# Patient Record
Sex: FEMALE | Race: WHITE | NOT HISPANIC OR LATINO | Employment: FULL TIME | URBAN - METROPOLITAN AREA
[De-identification: names, ages, dates, MRNs, and addresses within clinical notes are randomized per-mention and may not be internally consistent; named-entity substitution may affect disease eponyms.]

---

## 2017-07-24 ENCOUNTER — TRANSCRIBE ORDERS (OUTPATIENT)
Dept: ADMINISTRATIVE | Facility: HOSPITAL | Age: 47
End: 2017-07-24

## 2017-07-24 DIAGNOSIS — Z12.31 VISIT FOR SCREENING MAMMOGRAM: Primary | ICD-10-CM

## 2017-08-11 ENCOUNTER — HOSPITAL ENCOUNTER (OUTPATIENT)
Dept: RADIOLOGY | Age: 47
Discharge: HOME | End: 2017-08-11
Payer: COMMERCIAL

## 2017-08-11 DIAGNOSIS — Z12.31 VISIT FOR SCREENING MAMMOGRAM: ICD-10-CM

## 2017-08-11 PROCEDURE — 77063 BREAST TOMOSYNTHESIS BI: CPT

## 2017-08-11 PROCEDURE — G0202 SCR MAMMO BI INCL CAD: HCPCS

## 2018-03-23 ENCOUNTER — TELEPHONE (OUTPATIENT)
Dept: FAMILY MEDICINE CLINIC | Facility: CLINIC | Age: 48
End: 2018-03-23

## 2018-03-23 NOTE — TELEPHONE ENCOUNTER
----- Message from [address] sent at 3/23/2018  2:12 PM EDT -----  We got the pathology report on her  Looks like she's seeing surgeon at [facility]  Can you please call to make sure she's following up with them or someone  I'll put report on Gian's desk for review     Thanks

## 2018-03-23 NOTE — TELEPHONE ENCOUNTER
DENNIS Parrish             We got the pathology report on her  Looks like she's seeing surgeon at [facility]  Can you please call to make sure she's following up with them or someone   I'll put report on Gian's desk for review  Thanks        Spoke with pt she was dx with breast cancer, had surgery to remove what needed to be removed and is f/u with an oncologist  Pt was encouraged to call if she needed anything

## 2018-04-13 LAB — PREGNANCY TEST URINE (HISTORICAL): NEGATIVE

## 2018-04-17 LAB
ABSOL LYMPHOCYTES (HISTORICAL): 1.4 K/UL (ref 0.5–4)
ALBUMIN SERPL BCP-MCNC: 4.6 G/DL (ref 3–5.2)
ALP SERPL-CCNC: 73 U/L (ref 43–122)
ALT SERPL W P-5'-P-CCNC: 44 U/L (ref 9–52)
ANION GAP SERPL CALCULATED.3IONS-SCNC: 10 MMOL/L (ref 5–14)
AST SERPL W P-5'-P-CCNC: 39 U/L (ref 14–36)
BASOPHILS # BLD AUTO: 0.1 K/UL (ref 0–0.1)
BASOPHILS # BLD AUTO: 1 % (ref 0–1)
BILIRUB SERPL-MCNC: 0.7 MG/DL
BUN SERPL-MCNC: 12 MG/DL (ref 5–25)
CALCIUM SERPL-MCNC: 10 MG/DL (ref 8.4–10.2)
CHLORIDE SERPL-SCNC: 100 MEQ/L (ref 97–108)
CHOLEST SERPL-MCNC: 198 MG/DL
CHOLEST/HDLC SERPL: 3 {RATIO}
CO2 SERPL-SCNC: 31 MMOL/L (ref 22–30)
CREATINE, SERUM (HISTORICAL): 0.71 MG/DL (ref 0.6–1.2)
DEPRECATED RDW RBC AUTO: 12.9 %
EGFR (HISTORICAL): >60 ML/MIN/1.73 M2
EOSINOPHIL # BLD AUTO: 0.5 K/UL (ref 0–0.4)
EOSINOPHIL NFR BLD AUTO: 9 % (ref 0–6)
GLUCOSE SERPL-MCNC: 94 MG/DL (ref 70–99)
HCG QUANTITATIVE (HISTORICAL): <3 MIU/ML
HCT VFR BLD AUTO: 42.2 % (ref 36–46)
HDLC SERPL-MCNC: 67 MG/DL
HGB BLD-MCNC: 14.5 G/DL (ref 12–16)
LDL/HDL RATIO (HISTORICAL): 1.7
LDLC SERPL CALC-MCNC: 116 MG/DL
LYMPHOCYTES NFR BLD AUTO: 22 % (ref 25–45)
MCH RBC QN AUTO: 31.8 PG (ref 26–34)
MCHC RBC AUTO-ENTMCNC: 34.4 % (ref 31–36)
MCV RBC AUTO: 92 FL (ref 80–100)
MONOCYTES # BLD AUTO: 0.6 K/UL (ref 0.2–0.9)
MONOCYTES NFR BLD AUTO: 10 % (ref 1–10)
NEUTROPHILS ABS COUNT (HISTORICAL): 3.7 K/UL (ref 1.8–7.8)
NEUTS SEG NFR BLD AUTO: 58 % (ref 45–65)
PLATELET # BLD AUTO: 215 K/MCL (ref 150–450)
POTASSIUM SERPL-SCNC: 4.5 MEQ/L (ref 3.6–5)
RBC # BLD AUTO: 4.57 M/MCL (ref 4–5.2)
SODIUM SERPL-SCNC: 141 MEQ/L (ref 137–147)
TOTAL PROTEIN (HISTORICAL): 7.2 G/DL (ref 5.9–8.4)
TRIGL SERPL-MCNC: 75 MG/DL
TROPONIN I SERPL-MCNC: <0.01 NG/ML (ref 0–0.03)
VLDLC SERPL CALC-MCNC: 15 MG/DL (ref 0–40)
WBC # BLD AUTO: 6.2 K/MCL (ref 4.5–11)

## 2018-05-02 LAB
ABSOL LYMPHOCYTES (HISTORICAL): 1.4 K/UL (ref 0.5–4)
ALBUMIN SERPL BCP-MCNC: 4.1 G/DL (ref 3–5.2)
ALP SERPL-CCNC: 85 U/L (ref 43–122)
ALT SERPL W P-5'-P-CCNC: 38 U/L (ref 9–52)
ANION GAP SERPL CALCULATED.3IONS-SCNC: 10 MMOL/L (ref 5–14)
AST SERPL W P-5'-P-CCNC: 26 U/L (ref 14–36)
BANDS (HISTORICAL): 4 % (ref 3–11)
BASOPHILS # BLD AUTO: 0.1 K/UL (ref 0–0.1)
BASOPHILS # BLD AUTO: 1 % (ref 0–1)
BILIRUB SERPL-MCNC: 0.2 MG/DL
BUN SERPL-MCNC: 15 MG/DL (ref 5–25)
CALCIUM SERPL-MCNC: 9.3 MG/DL (ref 8.4–10.2)
CHLORIDE SERPL-SCNC: 103 MEQ/L (ref 97–108)
CO2 SERPL-SCNC: 27 MMOL/L (ref 22–30)
COMMENT (HISTORICAL): ABNORMAL
CREATINE, SERUM (HISTORICAL): 0.69 MG/DL (ref 0.6–1.2)
DEPRECATED RDW RBC AUTO: 12.7 %
EGFR (HISTORICAL): >60 ML/MIN/1.73 M2
GLUCOSE FASTING (HISTORICAL): 76 MG/DL (ref 70–99)
HCG QUANTITATIVE (HISTORICAL): <3 MIU/ML
HCT VFR BLD AUTO: 37.2 % (ref 36–46)
HGB BLD-MCNC: 12.6 G/DL (ref 12–16)
LYMPHOCYTES NFR BLD AUTO: 17 % (ref 25–45)
MCH RBC QN AUTO: 31.3 PG (ref 26–34)
MCHC RBC AUTO-ENTMCNC: 33.8 % (ref 31–36)
MCV RBC AUTO: 93 FL (ref 80–100)
MONOCYTES # BLD AUTO: 0.8 K/UL (ref 0.2–0.9)
MONOCYTES NFR BLD AUTO: 10 % (ref 1–10)
NEUTROPHILS ABS COUNT (HISTORICAL): 5.7 K/UL (ref 1.8–7.8)
NEUTS SEG NFR BLD AUTO: 68 % (ref 45–65)
PLATELET # BLD AUTO: 225 K/MCL (ref 150–450)
POTASSIUM SERPL-SCNC: 4.4 MEQ/L (ref 3.6–5)
RBC # BLD AUTO: 4.01 M/MCL (ref 4–5.2)
RBC MORPHOLOGY (HISTORICAL): ABNORMAL
SODIUM SERPL-SCNC: 141 MEQ/L (ref 137–147)
TOTAL PROTEIN (HISTORICAL): 6.5 G/DL (ref 5.9–8.4)
WBC # BLD AUTO: 8 K/MCL (ref 4.5–11)

## 2018-09-10 RX ORDER — CYCLOSPORINE 0.5 MG/ML
1 EMULSION OPHTHALMIC EVERY 12 HOURS
COMMUNITY

## 2018-09-10 RX ORDER — FUROSEMIDE 20 MG/1
20 TABLET ORAL
COMMUNITY
Start: 2018-09-07 | End: 2018-09-13

## 2018-09-10 RX ORDER — DOCUSATE SODIUM 100 MG/1
100 CAPSULE, LIQUID FILLED ORAL 2 TIMES DAILY PRN
COMMUNITY

## 2018-09-10 RX ORDER — VALACYCLOVIR HYDROCHLORIDE 1 G/1
1000 TABLET, FILM COATED ORAL
COMMUNITY
Start: 2018-04-27 | End: 2018-09-13

## 2018-09-10 RX ORDER — CALCIUM POLYCARBOPHIL 625 MG
TABLET ORAL
COMMUNITY

## 2018-09-10 RX ORDER — ONDANSETRON HYDROCHLORIDE 8 MG/1
8 TABLET, FILM COATED ORAL EVERY 8 HOURS
COMMUNITY
Start: 2018-04-10 | End: 2018-09-13

## 2018-09-10 RX ORDER — NORETHINDRONE ACETATE AND ETHINYL ESTRADIOL 1; .02 MG/1; MG/1
TABLET ORAL EVERY 24 HOURS
COMMUNITY
End: 2018-09-13

## 2018-09-10 RX ORDER — VALACYCLOVIR HYDROCHLORIDE 1 G/1
TABLET, FILM COATED ORAL
Refills: 2 | COMMUNITY
Start: 2018-07-19 | End: 2018-09-13

## 2018-09-10 RX ORDER — SIMETHICONE 80 MG
TABLET,CHEWABLE ORAL
COMMUNITY
End: 2018-09-13

## 2018-09-13 ENCOUNTER — ANNUAL EXAM (OUTPATIENT)
Dept: GYNECOLOGY | Facility: CLINIC | Age: 48
End: 2018-09-13
Payer: COMMERCIAL

## 2018-09-13 VITALS
TEMPERATURE: 97.6 F | WEIGHT: 134 LBS | SYSTOLIC BLOOD PRESSURE: 108 MMHG | HEART RATE: 84 BPM | BODY MASS INDEX: 22.88 KG/M2 | DIASTOLIC BLOOD PRESSURE: 68 MMHG | RESPIRATION RATE: 16 BRPM | HEIGHT: 64 IN

## 2018-09-13 DIAGNOSIS — Z11.51 SCREENING FOR HUMAN PAPILLOMAVIRUS: ICD-10-CM

## 2018-09-13 DIAGNOSIS — Z01.419 CERVICAL SMEAR, AS PART OF ROUTINE GYNECOLOGICAL EXAMINATION: Primary | ICD-10-CM

## 2018-09-13 DIAGNOSIS — Z86.19 HISTORY OF HERPES GENITALIS: ICD-10-CM

## 2018-09-13 PROCEDURE — 99396 PREV VISIT EST AGE 40-64: CPT | Performed by: OBSTETRICS & GYNECOLOGY

## 2018-09-13 PROCEDURE — G0145 SCR C/V CYTO,THINLAYER,RESCR: HCPCS | Performed by: OBSTETRICS & GYNECOLOGY

## 2018-09-13 PROCEDURE — 87624 HPV HI-RISK TYP POOLED RSLT: CPT | Performed by: OBSTETRICS & GYNECOLOGY

## 2018-09-13 RX ORDER — VALACYCLOVIR HYDROCHLORIDE 500 MG/1
500 TABLET, FILM COATED ORAL DAILY
Qty: 90 TABLET | Refills: 0 | Status: SHIPPED | OUTPATIENT
Start: 2018-09-13 | End: 2019-09-13

## 2018-09-13 RX ORDER — VALACYCLOVIR HYDROCHLORIDE 500 MG/1
500 TABLET, FILM COATED ORAL DAILY
Qty: 90 TABLET | Refills: 4 | Status: SHIPPED | OUTPATIENT
Start: 2018-09-13 | End: 2018-09-13 | Stop reason: SDUPTHER

## 2018-09-13 NOTE — PROGRESS NOTES
Assessment/Plan:       Diagnoses and all orders for this visit:    Cervical smear, as part of routine gynecological examination  -     Liquid-based pap, screening    Screening for human papillomavirus  -     Liquid-based pap, screening    History of herpes genitalis  -     Discontinue: valACYclovir (VALTREX) 500 mg tablet; Take 1 tablet (500 mg total) by mouth daily  -     valACYclovir (VALTREX) 500 mg tablet; Take 1 tablet (500 mg total) by mouth daily    Other orders  -     Calcium Carb-Cholecalciferol 500-400 MG-UNIT CHEW;   -     Cholecalciferol (D3-1000) 1000 units capsule; Take 1,000 Units by mouth  -     docusate sodium (COLACE) 100 mg capsule; Every 12 hours  -     Lactobacillus (PROBIOTIC ACIDOPHILUS PO);   -     cycloSPORINE (RESTASIS) 0 05 % ophthalmic emulsion; Apply 1 drop to eye  -     Calcium Polycarbophil (FIBER) 625 MG TABS;  as needed  -     Discontinue: furosemide (LASIX) 20 mg tablet; Take 20 mg by mouth  -     Discontinue: norethindrone-ethinyl estradiol (JUNEL 1/20) 1-20 MG-MCG per tablet; every 24 hours  -     Discontinue: ondansetron (ZOFRAN) 8 mg tablet; Take 8 mg by mouth every 8 (eight) hours  -     Discontinue: simethicone (MYLICON) 80 mg chewable tablet;  as needed  -     Discontinue: valACYclovir (VALTREX) 1,000 mg tablet; Take 1,000 mg by mouth  -     Discontinue: valACYclovir (VALTREX) 1,000 mg tablet; TAKE 1 TABLET (1,000 MG TOTAL) BY MOUTH AS NEEDED (HERPES)  Subjective:      Patient ID: Junior Madison is a 53 y o  female  The patient is a 62-year-old who presents for an annual gyn exam   She was diagnosed with triple negative breast cancer in February  She stopped oral contraceptives when she was diagnosed  The breast cancer was not seen on mammography  She herself found the lump and biopsy was done because of the dominant mass  She has 2 positive lymph nodes  She has just finished her chemotherapy and is scheduled to start radiation therapy    She had a period right after starting the chemo in late April or early May  She has had no period since then  She denies any hot flashes  She is aware that they may or may not come back  She will use condoms for contraception  If she has any issues with vaginal dryness, she will call  She has had no intercourse for the last couple of months  She has had 3 recurrences of rectal herpes since starting chemotherapy, each 1 when her white counts were low     Prior to the chemo she had very rare recurrences  She is considering suppressive therapy at this time  It is sometimes hard for her to determine whether she is having pain because of a fissure or whether it is recurrence of herpes  She has recently seen her colorectal surgeon and has just completed therapy for a fissure  She denies any bladder issues  The patient noticed a bump in the pubic area about 1 week ago  She squeezed it in some pus came out  At this time it is not painful, but it has not flattened out yet  The following portions of the patient's history were reviewed and updated as appropriate: allergies, current medications, past family history, past medical history, past social history, past surgical history and problem list     Review of Systems   Constitutional: Negative  Respiratory: Negative for shortness of breath  Cardiovascular: Negative for chest pain  Gastrointestinal: Negative  Genitourinary: Negative  Musculoskeletal: Positive for arthralgias and myalgias  Skin: Negative  Psychiatric/Behavioral: Negative for agitation, behavioral problems and confusion  Objective:      /68 (BP Location: Left arm, Patient Position: Sitting, Cuff Size: Standard)   Pulse 84   Temp 97 6 °F (36 4 °C) (Tympanic)   Resp 16   Ht 5' 4" (1 626 m)   Wt 60 8 kg (134 lb)   LMP 05/07/2018   Breastfeeding? No   BMI 23 00 kg/m²          Physical Exam   Constitutional: She appears well-developed and well-nourished  No distress     HENT: Head: Normocephalic  Pulmonary/Chest: Effort normal  No respiratory distress  Abdominal: She exhibits no distension and no mass  There is no tenderness  There is no rebound and no guarding  Genitourinary: Rectum normal  No breast swelling, tenderness, discharge or bleeding  No labial fusion  There is no rash, tenderness, lesion or injury on the right labia  There is no rash, tenderness, lesion or injury on the left labia  Uterus is deviated  Uterus is not enlarged, not fixed and not tender  Cervix exhibits no motion tenderness, no discharge and no friability  Right adnexum displays no mass, no tenderness and no fullness  Left adnexum displays no mass, no tenderness and no fullness  No erythema, tenderness or bleeding in the vagina  No foreign body in the vagina  No signs of injury around the vagina  No vaginal discharge found  Genitourinary Comments: Atrophic vaginal changes are noted  The cervix and uterus are normal size and shape, but deviated to the left  Lymphadenopathy:        Right axillary: No pectoral and no lateral adenopathy present  Left axillary: No pectoral and no lateral adenopathy present  Skin: Skin is warm, dry and intact  She is not diaphoretic  No cyanosis  Nails show no clubbing  Psychiatric: She has a normal mood and affect   Her speech is normal and behavior is normal

## 2018-09-17 LAB
HPV HR 12 DNA CVX QL NAA+PROBE: NEGATIVE
HPV16 DNA CVX QL NAA+PROBE: NEGATIVE
HPV18 DNA CVX QL NAA+PROBE: NEGATIVE

## 2018-09-18 LAB
LAB AP GYN PRIMARY INTERPRETATION: NORMAL
Lab: NORMAL

## 2018-11-26 ENCOUNTER — TELEPHONE (OUTPATIENT)
Dept: GYNECOLOGY | Facility: CLINIC | Age: 48
End: 2018-11-26

## 2018-12-05 RX ORDER — POTASSIUM CHLORIDE 1500 MG/1
20 TABLET, EXTENDED RELEASE ORAL DAILY
Refills: 0 | COMMUNITY
Start: 2018-09-11 | End: 2018-12-06

## 2018-12-06 ENCOUNTER — ANNUAL EXAM (OUTPATIENT)
Dept: GYNECOLOGY | Facility: CLINIC | Age: 48
End: 2018-12-06
Payer: COMMERCIAL

## 2018-12-06 ENCOUNTER — ANESTHESIA EVENT (OUTPATIENT)
Dept: PERIOP | Facility: HOSPITAL | Age: 48
End: 2018-12-06
Payer: COMMERCIAL

## 2018-12-06 VITALS
HEART RATE: 68 BPM | RESPIRATION RATE: 15 BRPM | DIASTOLIC BLOOD PRESSURE: 64 MMHG | SYSTOLIC BLOOD PRESSURE: 98 MMHG | HEIGHT: 64 IN | BODY MASS INDEX: 22.02 KG/M2 | WEIGHT: 129 LBS

## 2018-12-06 DIAGNOSIS — R10.2 PELVIC PAIN: ICD-10-CM

## 2018-12-06 DIAGNOSIS — Z85.3 PERSONAL HISTORY OF MALIGNANT NEOPLASM OF BREAST: Primary | ICD-10-CM

## 2018-12-06 DIAGNOSIS — N95.1 MENOPAUSAL SYMPTOMS: ICD-10-CM

## 2018-12-06 PROCEDURE — 99213 OFFICE O/P EST LOW 20 MIN: CPT | Performed by: OBSTETRICS & GYNECOLOGY

## 2018-12-06 RX ORDER — SIMETHICONE 80 MG
TABLET,CHEWABLE ORAL
COMMUNITY

## 2018-12-06 RX ORDER — NORETHINDRONE ACETATE AND ETHINYL ESTRADIOL 1; .02 MG/1; MG/1
TABLET ORAL EVERY 24 HOURS
COMMUNITY
End: 2018-12-06

## 2018-12-06 RX ORDER — TAMOXIFEN CITRATE 20 MG/1
20 TABLET ORAL DAILY
Refills: 2 | COMMUNITY
Start: 2018-11-12

## 2018-12-06 RX ORDER — NORETHINDRONE ACETATE AND ETHINYL ESTRADIOL 1MG-20(21)
KIT ORAL
COMMUNITY
Start: 2017-08-24 | End: 2018-12-06

## 2018-12-06 NOTE — PATIENT INSTRUCTIONS
U/s of pelvis -- will have endometiral biopsy if the endometrium becomes thick or any bleeding occurs on Tamoxifen  Baseline FSH and Estradiol levels

## 2018-12-06 NOTE — PROGRESS NOTES
Assessment/Plan:       Diagnoses and all orders for this visit:    Personal history of malignant neoplasm of breast  -     US pelvis complete w transvaginal; Future    Pelvic pain  -     US pelvis complete w transvaginal; Future    Menopausal symptoms  -     Estradiol; Future  -     FSH and LH; Future    Other orders  -     Discontinue: KLOR-CON M20 20 MEQ tablet; Take 20 mEq by mouth daily  -     Cancel: Liquid-based pap, screening  -     tamoxifen (NOLVADEX) 20 mg tablet; Take 20 mg by mouth daily  -     simethicone (MYLICON) 80 mg chewable tablet;  as needed  -     Discontinue: norethindrone-ethinyl estradiol (JUNEL 1/20) 1-20 MG-MCG per tablet; every 24 hours  -     Discontinue: norethindrone-ethinyl estradiol (JUNEL FE 1/20) 1-20 MG-MCG per tablet; 1 po daily  -     betamethasone valerate (VALISONE) 0 1 % cream; Apply 1 application topically 2 (two) times a day To affected area          Subjective:      Patient ID: Reyes Adrian is a 53 y o  female  The patient is a 60-year-old who was diagnosed with triple negative breast cancer last year  She has had genetic testing and is negative  She had surgery, radiation, and chemotherapy  She has not had any periods since the chemo  Earlier this year the tumor was retested and found to be weekly estrogen receptor positive  She started tamoxifen a few days ago  She states that she had some night sweats prior to starting tamoxifen  At this time her night sweats have decreased and there occurring only a few times a week  She does not have any true hot flashes although she feels a little bit warmer overall  When she was started on the tamoxifen, she was told about the possibility of endometrial cancer  This scared her and she wanted to stay talked to a gynecologist about the risks of endometrial cancer  We discussed that she can be followed with ultrasounds of the pelvis to check the endometrial thickness    If her endometrium is thick or she has any vaginal bleeding, she can have an endometrial biopsy  We discussed that endometrial cancer is usually found in early stage and responds well to a simple hysterectomy with bilateral salpingo-oophorectomy  She is aware from her oncologist that the risk of recurrence is greater than her risk of endometrial cancer from the tamoxifen  The patient also wanted to discuss whether she would benefit from hysterectomy with bilateral salpingo-oophorectomy to avoid the risk of endometrial cancer or recurrence of the slightly estrogen positive breast cancer  This is an option, but her estrogen levels are probably low, as evidenced by her night sweats and lack of periods for almost a year  These can easily be checked  If they are in the menopausal range, she may not have much of a benefit from this surgery  A factor that must be taken to into account is that she has had major abdominal surgery 5 with a hemicolectomy for a volvulus in 2007, then had a small bowel obstruction requiring surgery 2 years later  There is an increased risk of injury to the intestinal tract with 2 previous laparotomies  She is not anxious to undergo another major abdominal procedure  If she really wants to have the hysterectomy, she should make an appointment with a gynecologic oncologist   She believes she will wait at this time and not have additional surgery  She has intermittent pelvic pain, which she has attributed to IBS with constipation  The u/s will r/o any GYN reasons for the pain  Multiple questions were answered  She will be followed with an ultrasound of the pelvis every 6 months and will have a biopsy if the endometrium thickens or she has any bleeding  She will get her 1st ultrasound now as a baseline          The following portions of the patient's history were reviewed and updated as appropriate: allergies, current medications, past family history, past medical history, past social history, past surgical history and problem list     Review of Systems   Constitutional: Negative  Negative for chills and fever  Respiratory: Negative for shortness of breath  Cardiovascular: Negative for chest pain and leg swelling  Gastrointestinal: Negative  Negative for abdominal pain  Genitourinary: Negative for dysuria, frequency, urgency, vaginal bleeding, vaginal discharge and vaginal pain  Skin: Negative  Negative for rash  Hematological: Negative for adenopathy  Psychiatric/Behavioral: Negative for agitation, behavioral problems and confusion  Objective:      BP 98/64 (Cuff Size: Adult)   Pulse 68   Resp 15   Ht 5' 4" (1 626 m)   Wt 58 5 kg (129 lb)   BMI 22 14 kg/m²          Physical Exam   Constitutional: She appears well-developed and well-nourished  No distress  HENT:   Head: Normocephalic  Pulmonary/Chest: Effort normal  No respiratory distress  Skin: Skin is warm, dry and intact  She is not diaphoretic  No cyanosis  Nails show no clubbing  Psychiatric: She has a normal mood and affect   Her speech is normal and behavior is normal

## 2018-12-07 ENCOUNTER — ANESTHESIA (OUTPATIENT)
Dept: PERIOP | Facility: HOSPITAL | Age: 48
End: 2018-12-07
Payer: COMMERCIAL

## 2018-12-07 ENCOUNTER — HOSPITAL ENCOUNTER (OUTPATIENT)
Facility: HOSPITAL | Age: 48
Setting detail: OUTPATIENT SURGERY
Discharge: HOME | End: 2018-12-07
Attending: SURGERY | Admitting: SURGERY
Payer: COMMERCIAL

## 2018-12-07 VITALS
HEIGHT: 64 IN | OXYGEN SATURATION: 97 % | RESPIRATION RATE: 17 BRPM | DIASTOLIC BLOOD PRESSURE: 53 MMHG | SYSTOLIC BLOOD PRESSURE: 93 MMHG | HEART RATE: 57 BPM | TEMPERATURE: 98.2 F | WEIGHT: 126 LBS | BODY MASS INDEX: 21.51 KG/M2

## 2018-12-07 LAB — EXT PREGNANCY TEST URINE: NEGATIVE

## 2018-12-07 PROCEDURE — 81025 URINE PREGNANCY TEST: CPT | Performed by: ANESTHESIOLOGY

## 2018-12-07 RX ORDER — HYDROMORPHONE HCL/PF 1 MG/ML
0.5 SYRINGE (ML) INJECTION
Status: DISCONTINUED | OUTPATIENT
Start: 2018-12-07 | End: 2018-12-07 | Stop reason: HOSPADM

## 2018-12-07 RX ORDER — ACETAMINOPHEN 325 MG/1
650 TABLET ORAL EVERY 6 HOURS PRN
Status: DISCONTINUED | OUTPATIENT
Start: 2018-12-07 | End: 2018-12-07 | Stop reason: HOSPADM

## 2018-12-07 RX ORDER — FENTANYL CITRATE/PF 50 MCG/ML
25 SYRINGE (ML) INJECTION
Status: DISCONTINUED | OUTPATIENT
Start: 2018-12-07 | End: 2018-12-07 | Stop reason: HOSPADM

## 2018-12-07 RX ORDER — DIPHENHYDRAMINE HYDROCHLORIDE 50 MG/ML
12.5 INJECTION INTRAMUSCULAR; INTRAVENOUS ONCE AS NEEDED
Status: DISCONTINUED | OUTPATIENT
Start: 2018-12-07 | End: 2018-12-07 | Stop reason: HOSPADM

## 2018-12-07 RX ORDER — OXYCODONE HYDROCHLORIDE AND ACETAMINOPHEN 5; 325 MG/1; MG/1
1 TABLET ORAL EVERY 6 HOURS PRN
Status: DISCONTINUED | OUTPATIENT
Start: 2018-12-07 | End: 2018-12-07 | Stop reason: HOSPADM

## 2018-12-07 RX ORDER — KETOROLAC TROMETHAMINE 30 MG/ML
INJECTION, SOLUTION INTRAMUSCULAR; INTRAVENOUS AS NEEDED
Status: DISCONTINUED | OUTPATIENT
Start: 2018-12-07 | End: 2018-12-07 | Stop reason: SURG

## 2018-12-07 RX ORDER — PROPOFOL 10 MG/ML
INJECTION, EMULSION INTRAVENOUS AS NEEDED
Status: DISCONTINUED | OUTPATIENT
Start: 2018-12-07 | End: 2018-12-07 | Stop reason: SURG

## 2018-12-07 RX ORDER — SODIUM CHLORIDE, SODIUM LACTATE, POTASSIUM CHLORIDE, CALCIUM CHLORIDE 600; 310; 30; 20 MG/100ML; MG/100ML; MG/100ML; MG/100ML
125 INJECTION, SOLUTION INTRAVENOUS CONTINUOUS
Status: DISCONTINUED | OUTPATIENT
Start: 2018-12-07 | End: 2018-12-07 | Stop reason: HOSPADM

## 2018-12-07 RX ORDER — PROPOFOL 10 MG/ML
INJECTION, EMULSION INTRAVENOUS CONTINUOUS PRN
Status: DISCONTINUED | OUTPATIENT
Start: 2018-12-07 | End: 2018-12-07 | Stop reason: SURG

## 2018-12-07 RX ORDER — ONDANSETRON 2 MG/ML
INJECTION INTRAMUSCULAR; INTRAVENOUS AS NEEDED
Status: DISCONTINUED | OUTPATIENT
Start: 2018-12-07 | End: 2018-12-07 | Stop reason: SURG

## 2018-12-07 RX ORDER — LIDOCAINE HYDROCHLORIDE 10 MG/ML
INJECTION, SOLUTION INFILTRATION; PERINEURAL AS NEEDED
Status: DISCONTINUED | OUTPATIENT
Start: 2018-12-07 | End: 2018-12-07 | Stop reason: SURG

## 2018-12-07 RX ADMIN — ONDANSETRON HYDROCHLORIDE 4 MG: 2 INJECTION, SOLUTION INTRAMUSCULAR; INTRAVENOUS at 08:34

## 2018-12-07 RX ADMIN — PROPOFOL 50 MG: 10 INJECTION, EMULSION INTRAVENOUS at 08:28

## 2018-12-07 RX ADMIN — KETOROLAC TROMETHAMINE 30 MG: 30 INJECTION, SOLUTION INTRAMUSCULAR; INTRAVENOUS at 08:33

## 2018-12-07 RX ADMIN — SODIUM CHLORIDE, SODIUM LACTATE, POTASSIUM CHLORIDE, AND CALCIUM CHLORIDE: .6; .31; .03; .02 INJECTION, SOLUTION INTRAVENOUS at 07:45

## 2018-12-07 RX ADMIN — PROPOFOL 160 MCG/KG/MIN: 10 INJECTION, EMULSION INTRAVENOUS at 08:18

## 2018-12-07 RX ADMIN — LIDOCAINE HYDROCHLORIDE 50 MG: 10 INJECTION, SOLUTION INFILTRATION; PERINEURAL at 08:18

## 2018-12-07 NOTE — ANESTHESIA PREPROCEDURE EVALUATION
Review of Systems/Medical History  Patient summary reviewed  Chart reviewed      Cardiovascular  Exercise tolerance (METS): >4,  Hyperlipidemia,    Pulmonary  Negative pulmonary ROS        GI/Hepatic      Comment: IBS     Negative  ROS        Endo/Other  Negative endo/other ROS      GYN    Breast cancer ( chemo, XRT)        Hematology  Negative hematology ROS      Musculoskeletal  Scoliosis ,        Neurology  Negative neurology ROS      Psychology           Physical Exam    Airway    Mallampati score: I  TM Distance: >3 FB  Neck ROM: full     Dental       Cardiovascular  Cardiovascular exam normal    Pulmonary  Pulmonary exam normal     Other Findings        Anesthesia Plan  ASA Score- 2     Anesthesia Type- IV sedation with anesthesia with ASA Monitors  Additional Monitors:   Airway Plan:         Plan Factors-    Induction-     Postoperative Plan-     Informed Consent- Anesthetic plan and risks discussed with patient  I personally reviewed this patient with the CRNA  Discussed and agreed on the Anesthesia Plan with the CRNA  Edgar Morin

## 2018-12-07 NOTE — ANESTHESIA POSTPROCEDURE EVALUATION
Post-Op Assessment Note      CV Status:  Stable    Mental Status:  Awake    Hydration Status:  Euvolemic    PONV Controlled:  None    Airway Patency:  Patent    Post Op Vitals Reviewed: Yes          Staff: Anesthesiologist           BP   101/54   Temp   98 2   Pulse  57   Resp   17   SpO2   97%

## 2018-12-07 NOTE — OP NOTE
OPERATIVE REPORT  PATIENT NAME: Joe Cuello    :  1970  MRN: [id number]  Pt Location:  OR ROOM 12    SURGERY DATE: 2018    Surgeon(s) and Role:   Travis Cook MD - Primary    Preop Diagnosis:  Malignant neoplasm of female breast ([facility] [address] ) [C50 919]  Completion of chemotherapy  Post-Op Diagnosis Codes:    Malignant neoplasm of female breast ([facility] [address] ) [C50 919]    Procedure(s) (LRB):  REMOVAL PORT-A-CATH left chest (Left)    Specimen(s):  No specimens in log *    Estimated Blood Loss:   Minimal    Drains:       Anesthesia Type:   IV Sedation with Anesthesia    Operative Indications:  Malignant neoplasm of female breast ([facility] [address] ) [C50 919]  Completion of chemotherapy  Non utilized port    Operative Findings:  Has above  Complications:   None    Procedure and Technique:  Patient is adequately prepped and draped and identified on the operating table  The incision on the left side of the chest was incised  The port and the catheter secured  The catheter was completely removed and and was checked and found to be intact  The port was then dissected as well  Hemostasis was satisfactory  Incision was closed with monofilament absorbable material and Steri-Strips were applied  Blood loss was minimal   No drains were used  Patient tolerated the procedure     I was present for the entire procedure    Patient Disposition:  PACU     SIGNATURE: Travis Cook MD  DATE: 2018  TIME: 8:59 AM

## 2018-12-21 ENCOUNTER — HOSPITAL ENCOUNTER (OUTPATIENT)
Dept: ULTRASOUND IMAGING | Facility: HOSPITAL | Age: 48
Discharge: HOME | End: 2018-12-21
Attending: OBSTETRICS & GYNECOLOGY
Payer: COMMERCIAL

## 2018-12-21 DIAGNOSIS — Z85.3 PERSONAL HISTORY OF MALIGNANT NEOPLASM OF BREAST: ICD-10-CM

## 2018-12-21 DIAGNOSIS — R10.2 PELVIC PAIN: ICD-10-CM

## 2018-12-21 PROCEDURE — 76856 US EXAM PELVIC COMPLETE: CPT

## 2018-12-21 PROCEDURE — 76830 TRANSVAGINAL US NON-OB: CPT

## 2018-12-27 ENCOUNTER — TELEPHONE (OUTPATIENT)
Dept: GYNECOLOGY | Facility: CLINIC | Age: 48
End: 2018-12-27

## 2019-01-03 ENCOUNTER — OFFICE VISIT (OUTPATIENT)
Dept: GYNECOLOGY | Facility: CLINIC | Age: 49
End: 2019-01-03
Payer: COMMERCIAL

## 2019-01-03 VITALS
TEMPERATURE: 98.2 F | DIASTOLIC BLOOD PRESSURE: 53 MMHG | WEIGHT: 135 LBS | BODY MASS INDEX: 23.05 KG/M2 | SYSTOLIC BLOOD PRESSURE: 92 MMHG | HEART RATE: 57 BPM | HEIGHT: 64 IN | RESPIRATION RATE: 17 BRPM

## 2019-01-03 DIAGNOSIS — R93.89 THICKENED ENDOMETRIUM: Primary | ICD-10-CM

## 2019-01-03 PROCEDURE — 99212 OFFICE O/P EST SF 10 MIN: CPT | Performed by: OBSTETRICS & GYNECOLOGY

## 2019-01-03 PROCEDURE — 88305 TISSUE EXAM BY PATHOLOGIST: CPT | Performed by: PATHOLOGY

## 2019-01-03 PROCEDURE — 58100 BIOPSY OF UTERUS LINING: CPT | Performed by: OBSTETRICS & GYNECOLOGY

## 2019-01-03 NOTE — PROGRESS NOTES
Assessment/Plan:       Diagnoses and all orders for this visit:    Thickened endometrium  -     Tissue Exam          Subjective:      Patient ID: Kym Romero is a 53 y o  female  The patient is a 67-year-old who was diagnosed with breast cancer last year  It was initially felt to be triple negative, but retesting earlier this year showed that it was weekly estrogen receptor positive  The patient was started on tamoxifen  She is very concerned about Tamiko endometrial cancer  An ultrasound of the pelvis was ordered and showed a slightly thickened endometrium of 7 6 mm  She has not had a full year of amenorrhea yet, but has had no periods in the last 6 months  To be on the safe side, endometrial biopsy was recommended  The patient is anxious to proceed with this today  The following portions of the patient's history were reviewed and updated as appropriate: allergies, current medications, past family history, past medical history, past social history, past surgical history and problem list     Review of Systems   Constitutional: Negative  Respiratory: Negative for shortness of breath  Cardiovascular: Negative for chest pain  Gastrointestinal: Negative  Genitourinary: Negative  Skin: Negative  Psychiatric/Behavioral: Negative for agitation, behavioral problems and confusion  The patient is nervous/anxious  Objective:      BP 92/53 (BP Location: Left arm, Patient Position: Sitting, Cuff Size: Standard)   Pulse 57   Temp 98 2 °F (36 8 °C) (Tympanic)   Resp 17   Ht 5' 4" (1 626 m)   Wt 61 2 kg (135 lb)   BMI 23 17 kg/m²          Physical Exam   Constitutional: She appears well-developed and well-nourished  No distress  HENT:   Head: Normocephalic  Pulmonary/Chest: Effort normal  No respiratory distress  Genitourinary: No labial fusion  There is no rash, tenderness, lesion or injury on the right labia   There is no rash, tenderness, lesion or injury on the left labia  Uterus is not deviated, not enlarged, not fixed and not tender  Cervix exhibits no motion tenderness, no discharge and no friability  Right adnexum displays no mass, no tenderness and no fullness  Left adnexum displays no mass, no tenderness and no fullness  No erythema, tenderness or bleeding in the vagina  No foreign body in the vagina  No signs of injury around the vagina  No vaginal discharge found  Genitourinary Comments: The cervix is anterior  Skin: Skin is warm, dry and intact  She is not diaphoretic  No cyanosis  Nails show no clubbing  Psychiatric: She has a normal mood and affect  Her speech is normal and behavior is normal      Endometrial biopsy  Date/Time: 1/3/2019 1:17 PM  Performed by: Patrick Maloney  Authorized by: Patrick Maloney     Consent:     Consent obtained:  Verbal    Consent given by:  Patient    Patient questions answered: yes      Patient agrees, verbalizes understanding, and wants to proceed: yes      Educational handouts given: no      Instructions and paperwork completed: yes    Indication:     Indications:  Other disorder of menstruation and other abnormal bleeding from female genital tract    Pre-procedure:     Pre-procedure timeout performed: yes    Procedure:     Procedure: endometrial biopsy with Pipelle      A bivalve speculum was placed in the vagina: yes      Cervix cleaned and prepped: yes      A paracervical block was performed: no      An intracervical block was performed: no      The cervix was dilated: no      Uterus sounded: yes      Uterus sound depth (cm):  7 5    Specimen collected: specimen collected and sent to pathology      Patient tolerated procedure well with no complications: yes    Findings:     Uterus size:  Non-gravid    Cervix: normal      Adnexa: normal

## 2019-01-07 LAB
ESTRADIOL SERPL-MCNC: <15 PG/ML
FSH SERPL-ACNC: 107.5 MIU/ML
LH SERPL-ACNC: 41.2 MIU/ML

## 2019-01-09 ENCOUNTER — TELEPHONE (OUTPATIENT)
Dept: GYNECOLOGY | Facility: CLINIC | Age: 49
End: 2019-01-09

## 2019-01-09 NOTE — TELEPHONE ENCOUNTER
Pt called today looking for her test results concerning her EMB I left a message that it was normal and she can continue to take her tomoxifin

## 2019-01-22 ENCOUNTER — TELEPHONE (OUTPATIENT)
Dept: GYNECOLOGY | Facility: CLINIC | Age: 49
End: 2019-01-22

## 2019-01-22 NOTE — TELEPHONE ENCOUNTER
Pt called her bw results indicate that she is post menopausal pt wants to know if taking the tomoxifin   could be altering her bw results  Also pt states that she saw her dr and questioned what the EMB results said and she would like some clarification on this

## 2019-01-23 NOTE — TELEPHONE ENCOUNTER
Patient called back  Her oncologist was concerned that she has a small focus of mild atypia of uncertain significance and that a biopsy may need to be performed  The patient had a negative HPV 4 months ago  Pt was reassured that ASCUS with negative HPV can be followed with cotesting in 1 year  She has been under a lot of physical and emotional stress in the past year and is concerned that the HPV she had in the past, could have been reactivated  She will return for another HPV

## 2019-01-23 NOTE — TELEPHONE ENCOUNTER
Called all 3 numbers, left messages to call the office back on home and "other" numbers, Lambert Sampson is not working today at the work number listed  Asked pt to leave number at which she can be reached

## 2019-01-28 ENCOUNTER — ANNUAL EXAM (OUTPATIENT)
Dept: GYNECOLOGY | Facility: CLINIC | Age: 49
End: 2019-01-28
Payer: COMMERCIAL

## 2019-01-28 VITALS
WEIGHT: 129.4 LBS | DIASTOLIC BLOOD PRESSURE: 60 MMHG | SYSTOLIC BLOOD PRESSURE: 104 MMHG | RESPIRATION RATE: 17 BRPM | BODY MASS INDEX: 22.09 KG/M2 | HEIGHT: 64 IN

## 2019-01-28 DIAGNOSIS — R87.610 ASCUS OF CERVIX WITH NEGATIVE HIGH RISK HPV: Primary | ICD-10-CM

## 2019-01-28 PROCEDURE — 99213 OFFICE O/P EST LOW 20 MIN: CPT | Performed by: OBSTETRICS & GYNECOLOGY

## 2019-01-28 NOTE — PROGRESS NOTES
Assessment/Plan:       There are no diagnoses linked to this encounter  Subjective:      Patient ID: Pedro Burgess is a 53 y o  female  The patient is a 41-year-old with a history of breast cancer  Her estrogen receptors were faintly positive and she was started on tamoxifen  An ultrasound of the pelvis showed a thickened endometrium for a postmenopausal woman and an endometrial biopsy was performed  The endometrial biopsy showed no evidence of hyperplasia or malignancy  There was a small fragment of detached squamous epithelium with a minute focus of mild atypia of undetermined significance  The patient had had a negative Pap and HPV in September 2018  It was initially planned to repeat a Pap and HPV in September at her annual exam   According to ASCCP guidelines, an ASCUS Pap with negative HPV can be repeated at her next annual visit  With low grade NJ and a negative HPV, the preferred path is to repeat the co testing in 1 year, but   Colposcopy is acceptable  The patient is very anxious about this and would prefer to proceed with colposcopy  This will be scheduled  The following portions of the patient's history were reviewed and updated as appropriate: allergies, current medications, past family history, past medical history, past social history, past surgical history and problem list     Review of Systems   Constitutional: Negative  Respiratory: Negative for shortness of breath  Cardiovascular: Negative for chest pain  Gastrointestinal: Negative  Genitourinary: Negative  Skin: Negative  Psychiatric/Behavioral: Negative for agitation, behavioral problems and confusion  The patient is nervous/anxious            Objective:      /60 (BP Location: Left arm, Patient Position: Sitting, Cuff Size: Standard)   Resp 17   Ht 5' 4" (1 626 m)   Wt 58 7 kg (129 lb 6 4 oz)   BMI 22 21 kg/m²          Physical Exam   Constitutional: She appears well-developed and well-nourished  No distress  HENT:   Head: Normocephalic  Eyes: No scleral icterus  Neck: Normal range of motion  Pulmonary/Chest: Effort normal    Skin: Skin is warm  No rash noted  She is not diaphoretic  No erythema  No pallor  Psychiatric: She has a normal mood and affect   Her behavior is normal  Judgment and thought content normal

## 2019-02-04 ENCOUNTER — OFFICE VISIT (OUTPATIENT)
Dept: GYNECOLOGY | Facility: CLINIC | Age: 49
End: 2019-02-04
Payer: COMMERCIAL

## 2019-02-04 VITALS
BODY MASS INDEX: 21.85 KG/M2 | WEIGHT: 128 LBS | SYSTOLIC BLOOD PRESSURE: 110 MMHG | DIASTOLIC BLOOD PRESSURE: 62 MMHG | RESPIRATION RATE: 17 BRPM | HEIGHT: 64 IN

## 2019-02-04 DIAGNOSIS — R87.610 ATYPICAL SQUAMOUS CELLS OF UNDETERMINED SIGNIFICANCE (ASC-US) ON CERVICAL PAP SMEAR: Primary | ICD-10-CM

## 2019-02-04 PROCEDURE — 57454 BX/CURETT OF CERVIX W/SCOPE: CPT | Performed by: OBSTETRICS & GYNECOLOGY

## 2019-02-04 PROCEDURE — 88305 TISSUE EXAM BY PATHOLOGIST: CPT | Performed by: PATHOLOGY

## 2019-02-04 NOTE — PROGRESS NOTES
Assessment/Plan:       Diagnoses and all orders for this visit:    Atypical squamous cells of undetermined significance (ASC-US) on cervical Pap smear  -     Tissue Exam    Other orders  -     Cancel: Colposcopy          Subjective:      Patient ID: Christian Valdez is a 53 y o  female  The patient is a 21-year-old who has a history of breast cancer  She was seen in the office and discussed that she was on tamoxifen  An ultrasound of the pelvis showed that the endometrium was slightly thickened and she underwent endometrial biopsy  This showed a small focus of detached cervical squamous epithelium with mild atypia of undetermined significance  The endometrial tissue was benign with no hyperplasia or neoplasia  The patient had had a negative pap and HPV 4 months ago  The initial plan was to repeat the Pap smear and HPV in September at her annual   The patient became increasingly anxious about this and opted for a colposcopy rather than expectant management  She is here for colposcopy today  The following portions of the patient's history were reviewed and updated as appropriate: allergies, current medications, past family history, past medical history, past social history, past surgical history and problem list     Review of Systems   Constitutional: Negative  Respiratory: Negative for shortness of breath  Cardiovascular: Negative for chest pain  Gastrointestinal: Negative  Genitourinary: Negative  Skin: Negative  Psychiatric/Behavioral: Negative for agitation, behavioral problems and confusion  Objective:      /62 (BP Location: Left arm, Patient Position: Sitting, Cuff Size: Large)   Resp 17   Ht 5' 4" (1 626 m)   Wt 58 1 kg (128 lb)   BMI 21 97 kg/m²          Physical Exam   Constitutional: She appears well-developed and well-nourished  No distress  HENT:   Head: Normocephalic  Pulmonary/Chest: Effort normal  No respiratory distress     Genitourinary: No labial fusion  There is no rash, tenderness, lesion or injury on the right labia  There is no rash, tenderness, lesion or injury on the left labia  No erythema, tenderness or bleeding in the vagina  No foreign body in the vagina  No signs of injury around the vagina  No vaginal discharge found  Skin: Skin is warm, dry and intact  She is not diaphoretic  No cyanosis  Nails show no clubbing  Psychiatric: She has a normal mood and affect  Her speech is normal and behavior is normal      Colposcopy  Date/Time: 2/4/2019 6:34 PM  Performed by: Travis Shafer  Authorized by: Travis Shafer     Consent:     Consent obtained:  Verbal    Consent given by:  Patient    Procedural risks discussed:  Bleeding, infection and repeat procedure    Patient questions answered: yes      Patient agrees, verbalizes understanding, and wants to proceed: yes      Educational handouts given: no      Instructions and paperwork completed: yes    Pre-procedure:     Pre-procedure timeout performed: yes      Premeds:  Ibuprofen    Prepped with: acetic acid    Indication:     Indication:  ASC-US  Procedure:     Procedure: Colposcopy w/ cervical biopsy and ECC      Under satisfactory analgesia the patient was prepped and draped in the dorsal lithotomy position: yes      Goltry speculum was placed in the vagina: yes      Under colposcopic examination the transition zone was seen in entirety: yes      Intracervical block was performed: no      Endocervix was curetted using a Kevorkian curette: yes      Cervical biopsy performed with a cervical biopsy punch: yes      Tampon inserted: no      Monsel's solution was applied: yes      Biopsy(s): yes      Location:  ECC;  Cervix biopsied at 5:00 and 12:00   both specimens inadvertently sent in the same container  Post-procedure:     Findings: White epithelium      Impression comment:  Pathology pending    Patient tolerance of procedure:   Tolerated well, no immediate complications

## 2019-02-07 ENCOUNTER — TELEPHONE (OUTPATIENT)
Dept: FAMILY MEDICINE CLINIC | Facility: CLINIC | Age: 49
End: 2019-02-07

## 2019-02-08 ENCOUNTER — TELEPHONE (OUTPATIENT)
Dept: FAMILY MEDICINE CLINIC | Facility: CLINIC | Age: 49
End: 2019-02-08

## 2019-02-08 DIAGNOSIS — R87.810 CERVICAL HIGH RISK HPV (HUMAN PAPILLOMAVIRUS) TEST POSITIVE: Primary | ICD-10-CM

## 2019-02-08 RX ORDER — FOLIC ACID 1 MG/1
1 TABLET ORAL DAILY
Qty: 90 TABLET | Refills: 4 | Status: SHIPPED | OUTPATIENT
Start: 2019-02-08

## 2019-02-08 NOTE — TELEPHONE ENCOUNTER
This patient states this is her second call  She went to the pharmacy and spoke with the pharmacist could not find the OTC Folic Acid 1 mg   She states that you and " Ulises's nurse" told her to use this  Pharmacist states it is only a prescription in this strength  Can you send script to her pharmacy? Thanks!

## 2019-07-11 ENCOUNTER — OFFICE VISIT (OUTPATIENT)
Dept: FAMILY MEDICINE CLINIC | Facility: CLINIC | Age: 49
End: 2019-07-11
Payer: COMMERCIAL

## 2019-07-11 VITALS
BODY MASS INDEX: 22.36 KG/M2 | HEART RATE: 71 BPM | SYSTOLIC BLOOD PRESSURE: 112 MMHG | OXYGEN SATURATION: 99 % | DIASTOLIC BLOOD PRESSURE: 60 MMHG | TEMPERATURE: 98.1 F | HEIGHT: 63 IN | WEIGHT: 126.2 LBS

## 2019-07-11 DIAGNOSIS — M41.26 OTHER IDIOPATHIC SCOLIOSIS, LUMBAR REGION: ICD-10-CM

## 2019-07-11 DIAGNOSIS — C50.811 MALIGNANT NEOPLASM OF OVERLAPPING SITES OF RIGHT BREAST IN FEMALE, ESTROGEN RECEPTOR POSITIVE (HCC): ICD-10-CM
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
## 2019-07-11 DIAGNOSIS — Z17.0 MALIGNANT NEOPLASM OF OVERLAPPING SITES OF RIGHT BREAST IN FEMALE, ESTROGEN RECEPTOR POSITIVE (HCC): ICD-10-CM

## 2019-07-11 DIAGNOSIS — G89.29 CHRONIC RIGHT-SIDED LOW BACK PAIN WITHOUT SCIATICA: Primary | ICD-10-CM

## 2019-07-11 DIAGNOSIS — M54.50 CHRONIC RIGHT-SIDED LOW BACK PAIN WITHOUT SCIATICA: Primary | ICD-10-CM

## 2019-07-11 PROCEDURE — 99214 OFFICE O/P EST MOD 30 MIN: CPT | Performed by: FAMILY MEDICINE

## 2019-07-11 RX ORDER — LETROZOLE 2.5 MG/1
2.5 TABLET, FILM COATED ORAL DAILY
Refills: 2 | COMMUNITY
Start: 2019-07-06

## 2019-07-11 RX ORDER — LANOLIN ALCOHOL/MO/W.PET/CERES
1000 CREAM (GRAM) TOPICAL
COMMUNITY

## 2019-07-11 RX ORDER — MULTIVITAMIN WITH IRON
100 TABLET ORAL
COMMUNITY

## 2019-07-12 ENCOUNTER — APPOINTMENT (OUTPATIENT)
Dept: RADIOLOGY | Facility: MEDICAL CENTER | Age: 49
End: 2019-07-12
Payer: COMMERCIAL

## 2019-07-12 DIAGNOSIS — G89.29 CHRONIC RIGHT-SIDED LOW BACK PAIN WITHOUT SCIATICA: ICD-10-CM

## 2019-07-12 DIAGNOSIS — M54.50 CHRONIC RIGHT-SIDED LOW BACK PAIN WITHOUT SCIATICA: ICD-10-CM

## 2019-07-12 PROCEDURE — 72100 X-RAY EXAM L-S SPINE 2/3 VWS: CPT

## 2019-07-23 ENCOUNTER — TELEPHONE (OUTPATIENT)
Dept: FAMILY MEDICINE CLINIC | Facility: CLINIC | Age: 49
End: 2019-07-23

## 2019-07-23 NOTE — TELEPHONE ENCOUNTER
Maria T Denson called requesting the results of her x ray  Also pt is requesting if you can place an order and or  a script of medical massage therapy for Rachel Stephens her insurance will cover but they need a script with the dx please be faxed to [phone] pt does have an appointment next week   Pt's number is [phone]

## 2019-07-23 NOTE — TELEPHONE ENCOUNTER
X-ray showed some slight increase of her scoliosis when compared to the x-ray from 2 years ago    Can you put an order in for medical massage therapy for Farrel Labrum diagnosis chronic back pain and scoliosis

## 2019-07-24 ENCOUNTER — TRANSCRIBE ORDERS (OUTPATIENT)
Dept: FAMILY MEDICINE CLINIC | Facility: CLINIC | Age: 49
End: 2019-07-24

## 2019-07-24 DIAGNOSIS — Z00.00 HEALTH CARE MAINTENANCE: ICD-10-CM

## 2019-07-24 DIAGNOSIS — E55.9 VITAMIN D DEFICIENCY: ICD-10-CM

## 2019-07-24 DIAGNOSIS — M41.9 SCOLIOSIS, UNSPECIFIED SCOLIOSIS TYPE, UNSPECIFIED SPINAL REGION: Primary | ICD-10-CM

## 2019-07-24 DIAGNOSIS — D72.819 LEUKOPENIA, UNSPECIFIED TYPE: Primary | ICD-10-CM

## 2019-07-24 DIAGNOSIS — C50.919 MALIGNANT NEOPLASM OF FEMALE BREAST, UNSPECIFIED ESTROGEN RECEPTOR STATUS, UNSPECIFIED LATERALITY, UNSPECIFIED SITE OF BREAST (HCC): ICD-10-CM

## 2019-07-24 NOTE — TELEPHONE ENCOUNTER
Pt is aware and was wondering if she could get BW done because she states that she has not had BW done for awhile   Please advise, thanks

## 2019-07-28 PROBLEM — Z12.11 SCREEN FOR COLON CANCER: Status: ACTIVE | Noted: 2018-12-31

## 2019-07-28 PROBLEM — K59.03 DRUG-INDUCED CONSTIPATION: Status: ACTIVE | Noted: 2018-05-18

## 2019-07-28 PROBLEM — Z17.0 MALIGNANT NEOPLASM OF OVERLAPPING SITES OF RIGHT BREAST IN FEMALE, ESTROGEN RECEPTOR POSITIVE (HCC): Status: ACTIVE | Noted: 2018-04-02

## 2019-07-28 PROBLEM — C50.811 MALIGNANT NEOPLASM OF OVERLAPPING SITES OF RIGHT BREAST IN FEMALE, ESTROGEN RECEPTOR POSITIVE (HCC): Status: ACTIVE | Noted: 2018-04-02
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
## 2019-07-29 ENCOUNTER — ANNUAL EXAM (OUTPATIENT)
Dept: GYNECOLOGY | Facility: CLINIC | Age: 49
End: 2019-07-29
Payer: COMMERCIAL

## 2019-07-29 VITALS
BODY MASS INDEX: 22.02 KG/M2 | HEART RATE: 73 BPM | HEIGHT: 64 IN | SYSTOLIC BLOOD PRESSURE: 90 MMHG | WEIGHT: 129 LBS | RESPIRATION RATE: 16 BRPM | TEMPERATURE: 99.1 F | DIASTOLIC BLOOD PRESSURE: 54 MMHG

## 2019-07-29 DIAGNOSIS — Z17.0 MALIGNANT NEOPLASM OF OVERLAPPING SITES OF RIGHT BREAST IN FEMALE, ESTROGEN RECEPTOR POSITIVE (HCC): ICD-10-CM

## 2019-07-29 DIAGNOSIS — Z87.410 PERSONAL HISTORY OF CERVICAL DYSPLASIA: Primary | ICD-10-CM

## 2019-07-29 DIAGNOSIS — Z11.51 ENCOUNTER FOR SCREENING FOR HUMAN PAPILLOMAVIRUS (HPV): ICD-10-CM

## 2019-07-29 DIAGNOSIS — C50.811 MALIGNANT NEOPLASM OF OVERLAPPING SITES OF RIGHT BREAST IN FEMALE, ESTROGEN RECEPTOR POSITIVE (HCC): ICD-10-CM

## 2019-07-29 PROCEDURE — G0145 SCR C/V CYTO,THINLAYER,RESCR: HCPCS | Performed by: OBSTETRICS & GYNECOLOGY

## 2019-07-29 PROCEDURE — 87624 HPV HI-RISK TYP POOLED RSLT: CPT | Performed by: OBSTETRICS & GYNECOLOGY

## 2019-07-29 PROCEDURE — 99213 OFFICE O/P EST LOW 20 MIN: CPT | Performed by: OBSTETRICS & GYNECOLOGY

## 2019-07-31 LAB
LAB AP GYN PRIMARY INTERPRETATION: NORMAL
Lab: NORMAL

## 2019-08-01 ENCOUNTER — TELEPHONE (OUTPATIENT)
Dept: GYNECOLOGY | Facility: CLINIC | Age: 49
End: 2019-08-01

## 2019-08-06 LAB
25(OH)D3 SERPL-MCNC: 33 NG/ML (ref 30–100)
ALBUMIN SERPL-MCNC: 4.2 G/DL (ref 3.6–5.1)
ALBUMIN/GLOB SERPL: 2.1 (CALC) (ref 1–2.5)
ALP SERPL-CCNC: 97 U/L (ref 33–115)
ALT SERPL-CCNC: 17 U/L (ref 6–29)
AST SERPL-CCNC: 21 U/L (ref 10–35)
BASOPHILS # BLD AUTO: 61 CELLS/UL (ref 0–200)
BASOPHILS NFR BLD AUTO: 2.1 %
BILIRUB SERPL-MCNC: 0.4 MG/DL (ref 0.2–1.2)
BUN SERPL-MCNC: 13 MG/DL (ref 7–25)
BUN/CREAT SERPL: NORMAL (CALC) (ref 6–22)
CALCIUM SERPL-MCNC: 9.5 MG/DL (ref 8.6–10.2)
CHLORIDE SERPL-SCNC: 106 MMOL/L (ref 98–110)
CHOLEST SERPL-MCNC: 186 MG/DL
CHOLEST/HDLC SERPL: 3.2 (CALC)
CO2 SERPL-SCNC: 32 MMOL/L (ref 20–32)
CREAT SERPL-MCNC: 0.81 MG/DL (ref 0.5–1.1)
EOSINOPHIL # BLD AUTO: 78 CELLS/UL (ref 15–500)
EOSINOPHIL NFR BLD AUTO: 2.7 %
ERYTHROCYTE [DISTWIDTH] IN BLOOD BY AUTOMATED COUNT: 12.9 % (ref 11–15)
GLOBULIN SER CALC-MCNC: 2 G/DL (CALC) (ref 1.9–3.7)
GLUCOSE SERPL-MCNC: 89 MG/DL (ref 65–99)
HCT VFR BLD AUTO: 39.4 % (ref 35–45)
HDLC SERPL-MCNC: 59 MG/DL
HGB BLD-MCNC: 13.2 G/DL (ref 11.7–15.5)
LDLC SERPL CALC-MCNC: 113 MG/DL (CALC)
LYMPHOCYTES # BLD AUTO: 716 CELLS/UL (ref 850–3900)
LYMPHOCYTES NFR BLD AUTO: 24.7 %
MCH RBC QN AUTO: 31.4 PG (ref 27–33)
MCHC RBC AUTO-ENTMCNC: 33.5 G/DL (ref 32–36)
MCV RBC AUTO: 93.6 FL (ref 80–100)
MONOCYTES # BLD AUTO: 307 CELLS/UL (ref 200–950)
MONOCYTES NFR BLD AUTO: 10.6 %
NEUTROPHILS # BLD AUTO: 1737 CELLS/UL (ref 1500–7800)
NEUTROPHILS NFR BLD AUTO: 59.9 %
NONHDLC SERPL-MCNC: 127 MG/DL (CALC)
PLATELET # BLD AUTO: 231 THOUSAND/UL (ref 140–400)
PMV BLD REES-ECKER: 10.4 FL (ref 7.5–12.5)
POTASSIUM SERPL-SCNC: 4.4 MMOL/L (ref 3.5–5.3)
PROT SERPL-MCNC: 6.2 G/DL (ref 6.1–8.1)
RBC # BLD AUTO: 4.21 MILLION/UL (ref 3.8–5.1)
SL AMB EGFR AFRICAN AMERICAN: 100 ML/MIN/1.73M2
SL AMB EGFR NON AFRICAN AMERICAN: 86 ML/MIN/1.73M2
SODIUM SERPL-SCNC: 143 MMOL/L (ref 135–146)
TRIGL SERPL-MCNC: 58 MG/DL
TSH SERPL-ACNC: 1.44 MIU/L
WBC # BLD AUTO: 2.9 THOUSAND/UL (ref 3.8–10.8)

## 2019-09-09 ENCOUNTER — TELEPHONE (OUTPATIENT)
Dept: GYNECOLOGY | Facility: CLINIC | Age: 49
End: 2019-09-09

## 2019-09-09 NOTE — TELEPHONE ENCOUNTER
Pt called asking for an appt in 1/7/2020, pt has had cancer in the past  The patient is a 60-year-old who was diagnosed with low-grade NJ on colposcopy in September of 2018  She was due to come back for a 6 month follow-up Pap, but has been undergoing radiation therapy and chemotherapy for a newly diagnosed breast cancer in February 2018  you will not be here in Jan and I do not know if I should set up this appt         Please advise

## 2019-09-30 NOTE — PROGRESS NOTES
Assessment/Plan:       Diagnoses and all orders for this visit:    Personal history of cervical dysplasia    Encounter for screening for human papillomavirus (HPV)  -     Liquid-based pap, screening    Malignant neoplasm of overlapping sites of right breast in female, estrogen receptor positive ([facility] [address] )          Subjective:      Patient ID: Raoul Akers is a 51 y o  female  The patient is a 28-year-old who was diagnosed with low-grade NJ on colposcopy in September of 2018  She was due to come back for a 6 month follow-up Pap, but has been undergoing radiation therapy and chemotherapy for a newly diagnosed breast cancer in February 2018  She is here today for a follow-up Pap smear  She is currently on Zoladex  She states she is having some hot flashes but they are tolerable  She has had genetic testing and is negative  She has had no vaginal bleeding  The following portions of the patient's history were reviewed and updated as appropriate: allergies, current medications, past family history, past medical history, past social history, past surgical history and problem list     Review of Systems   Constitutional: Negative  Respiratory: Negative for shortness of breath  Cardiovascular: Negative for chest pain  Gastrointestinal: Negative  Genitourinary: Negative  Musculoskeletal: Positive for arthralgias  Skin: Negative  Psychiatric/Behavioral: Negative for agitation, behavioral problems and confusion  Objective:      BP 90/54 (BP Location: Left arm, Patient Position: Sitting, Cuff Size: Standard)   Pulse 73   Temp 99 1 °F (37 3 °C) (Tympanic)   Resp 16   Ht 5' 4" (1 626 m)   Wt 58 5 kg (129 lb)   BMI 22 14 kg/m²          Physical Exam   Constitutional: She appears well-developed and well-nourished  No distress  HENT:   Head: Normocephalic  Pulmonary/Chest: Effort normal  No respiratory distress  Genitourinary: Pelvic exam was performed with patient supine   No labial fusion  There is no rash, tenderness, lesion or injury on the right labia  There is no rash, tenderness, lesion or injury on the left labia  Cervix exhibits motion tenderness, discharge and friability  No erythema, tenderness or bleeding in the vagina  No foreign body in the vagina  No signs of injury around the vagina  No vaginal discharge found  Genitourinary Comments: Atrophic vaginal changes are present  Skin: Skin is warm, dry and intact  She is not diaphoretic  No cyanosis  Nails show no clubbing  Psychiatric: She has a normal mood and affect   Her speech is normal and behavior is normal  No

## 2019-11-07 ENCOUNTER — TRANSCRIBE ORDERS (OUTPATIENT)
Dept: ADMINISTRATIVE | Facility: HOSPITAL | Age: 49
End: 2019-11-07

## 2019-11-07 DIAGNOSIS — Z90.10 POSTMASTECTOMY LYMPHANGIOSARCOMA SYNDROME, UNSPECIFIED LATERALITY (HCC): Primary | ICD-10-CM

## 2019-11-07 DIAGNOSIS — C50.919 POSTMASTECTOMY LYMPHANGIOSARCOMA SYNDROME, UNSPECIFIED LATERALITY (HCC): Primary | ICD-10-CM

## 2019-11-12 ENCOUNTER — HOSPITAL ENCOUNTER (OUTPATIENT)
Dept: RADIOLOGY | Facility: HOSPITAL | Age: 49
Discharge: HOME | End: 2019-11-12
Attending: SURGERY
Payer: COMMERCIAL

## 2019-11-12 ENCOUNTER — HOSPITAL ENCOUNTER (OUTPATIENT)
Dept: RADIOLOGY | Facility: HOSPITAL | Age: 49
Discharge: HOME | End: 2019-11-12
Attending: SURGERY

## 2019-11-12 DIAGNOSIS — Z90.10 POSTMASTECTOMY LYMPHANGIOSARCOMA SYNDROME, UNSPECIFIED LATERALITY (HCC): ICD-10-CM

## 2019-11-12 DIAGNOSIS — C50.919 POSTMASTECTOMY LYMPHANGIOSARCOMA SYNDROME, UNSPECIFIED LATERALITY (HCC): ICD-10-CM

## 2019-11-12 PROCEDURE — 78306 BONE IMAGING WHOLE BODY: CPT

## 2019-11-12 PROCEDURE — A9503 TC99M MEDRONATE: HCPCS

## 2019-12-04 ENCOUNTER — TELEPHONE (OUTPATIENT)
Dept: GYNECOLOGY | Facility: CLINIC | Age: 49
End: 2019-12-04

## 2019-12-04 NOTE — TELEPHONE ENCOUNTER
Spoke with patient by phone  She is struggling with the new diagnosis of metastatic breast cancer  She is now on an oral chemo and was told to stop the folic acid  Discussed with patient that her last HPV was negative and the pap was normal, therefore she can safely stop the folic acid  She is waiting for results of testing to determine if there is a biomarker that can be targeted

## 2019-12-04 NOTE — TELEPHONE ENCOUNTER
Pt called wanting to let you know that she does have metastatic breast cancer and the medication that they put her on Xeloda and this does not work well with folic acid  Pt states that her onocologist did state that her non hpv 17 or 19 is getting better with the folic acid, is there anything else that you would like for her to take

## 2020-03-25 LAB — EXT SARS-COV-2: NOT DETECTED

## 2020-05-15 ENCOUNTER — TELEPHONE (OUTPATIENT)
Dept: OTHER | Facility: OTHER | Age: 50
End: 2020-05-15

## (undated) DEVICE — THYROID SHEET: Brand: CONVERTORS

## (undated) DEVICE — STERILE POLYISOPRENE POWDER-FREE SURGICAL GLOVES: Brand: PROTEXIS

## (undated) DEVICE — GAUZE,SPONGE,2"X2",8PLY,STERILE,LF,2'S: Brand: MEDLINE

## (undated) DEVICE — LAMINECTOMY ARM CRADLE FOAM POSITIONER: Brand: CARDINAL HEALTH

## (undated) DEVICE — SPONGE LAP STERILE 4X18

## (undated) DEVICE — BASIC PACK: Brand: CONVERTORS

## (undated) DEVICE — SYRINGE 30ML LL

## (undated) DEVICE — 3M™ STERI-STRIP™ REINFORCED ADHESIVE SKIN CLOSURES, R1547, 1/2 IN X 4 IN (12 MM X 100 MM), 6 STRIPS/ENVELOPE: Brand: 3M™ STERI-STRIP™

## (undated) DEVICE — INTENDED FOR TISSUE SEPARATION, AND OTHER PROCEDURES THAT REQUIRE A SHARP SURGICAL BLADE TO PUNCTURE OR CUT.: Brand: BARD-PARKER SAFETY BLADES SIZE 15, STERILE

## (undated) DEVICE — NEEDLE 25G X 1 1/2

## (undated) DEVICE — MASTISOL LIQ ADHESIVE 2/3ML

## (undated) DEVICE — NDL CNTR 20CT FM MAG: Brand: MEDLINE INDUSTRIES, INC.

## (undated) DEVICE — NEEDLE BLUNT 18 G X 1 1/2IN

## (undated) DEVICE — SUT PDS II 4-0 PS-2 18 IN Z496G

## (undated) DEVICE — SKIN MARKER DUAL TIP WITH RULER CAP, FLEXIBLE RULER AND LABELS: Brand: DEVON

## (undated) DEVICE — CHLORAPREP HI-LITE 26ML ORANGE

## (undated) DEVICE — PENCIL ELECTROSURG E-Z CLEAN -0035H